# Patient Record
Sex: MALE | Race: WHITE | NOT HISPANIC OR LATINO | Employment: FULL TIME
[De-identification: names, ages, dates, MRNs, and addresses within clinical notes are randomized per-mention and may not be internally consistent; named-entity substitution may affect disease eponyms.]

---

## 2024-01-05 ENCOUNTER — TRANSCRIBE ORDERS (OUTPATIENT)
Dept: OTHER | Age: 44
End: 2024-01-05

## 2024-01-05 DIAGNOSIS — M76.52 PATELLAR TENDINITIS OF LEFT KNEE: Primary | ICD-10-CM

## 2024-01-29 ASSESSMENT — ACTIVITIES OF DAILY LIVING (ADL)
GO DOWN STAIRS: ACTIVITY IS MINIMALLY DIFFICULT
KNEEL ON THE FRONT OF YOUR KNEE: ACTIVITY IS SOMEWHAT DIFFICULT
GIVING WAY, BUCKLING OR SHIFTING OF KNEE: I DO NOT HAVE THE SYMPTOM
SQUAT: ACTIVITY IS MINIMALLY DIFFICULT
HOW_WOULD_YOU_RATE_THE_CURRENT_FUNCTION_OF_YOUR_KNEE_DURING_YOUR_USUAL_DAILY_ACTIVITIES_ON_A_SCALE_FROM_0_TO_100_WITH_100_BEING_YOUR_LEVEL_OF_KNEE_FUNCTION_PRIOR_TO_YOUR_INJURY_AND_0_BEING_THE_INABILITY_TO_PERFORM_ANY_OF_YOUR_USUAL_DAILY_ACTIVITIES?: 80
STIFFNESS: THE SYMPTOM AFFECTS MY ACTIVITY SLIGHTLY
GO UP STAIRS: ACTIVITY IS MINIMALLY DIFFICULT
SIT WITH YOUR KNEE BENT: ACTIVITY IS NOT DIFFICULT
SWELLING: THE SYMPTOM AFFECTS MY ACTIVITY MODERATELY
PAIN: THE SYMPTOM AFFECTS MY ACTIVITY MODERATELY
PLEASE_INDICATE_YOR_PRIMARY_REASON_FOR_REFERRAL_TO_THERAPY:: KNEE
LIMPING: THE SYMPTOM AFFECTS MY ACTIVITY SLIGHTLY
KNEEL ON THE FRONT OF YOUR KNEE: ACTIVITY IS SOMEWHAT DIFFICULT
HOW_WOULD_YOU_RATE_THE_CURRENT_FUNCTION_OF_YOUR_KNEE_DURING_YOUR_USUAL_DAILY_ACTIVITIES_ON_A_SCALE_FROM_0_TO_100_WITH_100_BEING_YOUR_LEVEL_OF_KNEE_FUNCTION_PRIOR_TO_YOUR_INJURY_AND_0_BEING_THE_INABILITY_TO_PERFORM_ANY_OF_YOUR_USUAL_DAILY_ACTIVITIES?: 80
WEAKNESS: THE SYMPTOM AFFECTS MY ACTIVITY SLIGHTLY
AS_A_RESULT_OF_YOUR_KNEE_INJURY,_HOW_WOULD_YOU_RATE_YOUR_CURRENT_LEVEL_OF_DAILY_ACTIVITY?: NEARLY NORMAL
SIT WITH YOUR KNEE BENT: ACTIVITY IS NOT DIFFICULT
STIFFNESS: THE SYMPTOM AFFECTS MY ACTIVITY SLIGHTLY
PAIN: THE SYMPTOM AFFECTS MY ACTIVITY MODERATELY
GO UP STAIRS: ACTIVITY IS MINIMALLY DIFFICULT
STAND: ACTIVITY IS MINIMALLY DIFFICULT
LIMPING: THE SYMPTOM AFFECTS MY ACTIVITY SLIGHTLY
RAW_SCORE: 51
WALK: ACTIVITY IS MINIMALLY DIFFICULT
RISE FROM A CHAIR: ACTIVITY IS NOT DIFFICULT
HOW_WOULD_YOU_RATE_THE_OVERALL_FUNCTION_OF_YOUR_KNEE_DURING_YOUR_USUAL_DAILY_ACTIVITIES?: NEARLY NORMAL
KNEE_ACTIVITY_OF_DAILY_LIVING_SUM: 51
SWELLING: THE SYMPTOM AFFECTS MY ACTIVITY MODERATELY
WEAKNESS: THE SYMPTOM AFFECTS MY ACTIVITY SLIGHTLY
AS_A_RESULT_OF_YOUR_KNEE_INJURY,_HOW_WOULD_YOU_RATE_YOUR_CURRENT_LEVEL_OF_DAILY_ACTIVITY?: NEARLY NORMAL
GO DOWN STAIRS: ACTIVITY IS MINIMALLY DIFFICULT
STAND: ACTIVITY IS MINIMALLY DIFFICULT
WALK: ACTIVITY IS MINIMALLY DIFFICULT
KNEE_ACTIVITY_OF_DAILY_LIVING_SCORE: 72.86
SQUAT: ACTIVITY IS MINIMALLY DIFFICULT
GIVING WAY, BUCKLING OR SHIFTING OF KNEE: I DO NOT HAVE THE SYMPTOM
HOW_WOULD_YOU_RATE_THE_OVERALL_FUNCTION_OF_YOUR_KNEE_DURING_YOUR_USUAL_DAILY_ACTIVITIES?: NEARLY NORMAL
RISE FROM A CHAIR: ACTIVITY IS NOT DIFFICULT

## 2024-01-29 NOTE — PROGRESS NOTES
"PHYSICAL THERAPY EVALUATION  Type of Visit: Evaluation    See electronic medical record for Abuse and Falls Screening details.    Subjective   Has had left knee issues on & off for a while. Current complaint of patellar tendon pain that worsens with jumping.   Does a lot of sports & exercise so is tough on his knee; it is his jumping/takeoff leg. Plays basketball 3-4 days per week, marathon runner, golf & curling. Patella fracture 1.5 years ago landed on kneecap playing basketball - non-op treatment did PT and got \"most of the way back.\"   This past fall training for marathon had increasing foot pain so stopped training after about 18 miles & took some time off.   Instead started strength training more for golf, including jumping, which seemed to flare the knee again. This started in late October. Continued doing jumping program until pain increased to the point it affected his walking. Has not tried jump training since mid November, but has continued with strength training. Pain improved when stopping so much jumping.   Last week tried to do box jumps again (first time in a while) and it was still quite painful. More painful on the take-off than the landing.  Goes to [employer] and works with a ; has a full gym at work. Goes into office & gym 3x/week.   Hx of quad tendon pain with running.   Also reports occasional anterior shin pain (anterior/lateral) - sometimes the next day or evenings after workouts.          Presenting condition or subjective complaint: Left knee pain  Date of onset: 11/30/23    Relevant medical history:     Dates & types of surgery:  None    Prior diagnostic imaging/testing results: MRI; X-ray   1/15/23  1. Partial tearing/tendinopathy of the proximal patellar tendon as described.  2. Full-thickness fissuring of central trochlear cartilage with mild adjacent subchondral marrow edema.  3. Mild edema at the superolateral aspect of Hoffa's fat, can be seen with " impingement. May also in part be reactive given the patellar tendinopathy.  4. No ligamentous or meniscal tear identified.   Prior therapy history for the same diagnosis, illness or injury: Yes PT for fractured knee cap and tendinitis, May 2022    Prior Level of Function  Transfers: Independent  Ambulation: Independent  ADL: Independent  IADL: Childcare, Driving, Finances, Work, Yard work    Living Environment  Social support: With a significant other or spouse & 2 kids age 13 and 9  Type of home: House   Stairs to enter the home: Yes 3 Is there a railing: Yes   Ramp: No   Stairs inside the home: Yes 16 Is there a railing: Yes   Help at home: None  Equipment owned:       Employment: Yes [employer]   Hobbies/Interests: Golf, curling, running, basketball. Golf weekly through the summer.   Workout: 3-days per week full body. Including heel elevated dumbbell squatting (occasional but rare pain); heel elevated step-downs; single RDLs; side lunge with a twist. Doing something different 3 days. Knee has been mostly pain-free with strength training  -also does mobility work almost every day, including ankles, shoulders, & back  -current program does not include leg press machine  -Current program has been going on about 1 month.     Patient goals for therapy: Run and play basketball     Pain assessment: Pain denied  See objective evaluation for additional pain details     Objective   KNEE EVALUATION  PAIN: Pain Level at Rest: 0/10  Pain Level with Use: 4/10  Pain Location: patellar tendon (globally), occasionally radiates med & lat around fat pad/liliya-patellar area   Pain Quality: stiff (morning); achy in general; sharp with jumping  Pain Frequency: intermittent or most days per week but not every day   Pain is Worst: morning or with activity   Pain is Exacerbated By: jumping  Pain is Relieved By: resting/not jumping; icing; ibuprofen. Tried a patellar strap but felt like it was irritating it more.  Pain  "Progression: Improved  INTEGUMENTARY (edema, incisions):  prominent bump at inferior patellar pole      BALANCE/PROPRIOCEPTION: WNL  WEIGHTBEARING ALIGNMENT: WNL    ROM: PROM WNL - no knee pain with overpressure into extension or flexion  Hip: globally decreased into IR, ER, and JENNIFER bilaterally (no pain)    STRENGTH: force measured in pounds at 60 deg knee flexion using Tindeq   Left Right Limb Symmetry Index   Knee Extension 147.1 lbs  -last 2 reps slight twinge 167 lbs 88%     FLEXIBILITY: Decreased piriformis L, Decreased quadriceps L, Decreased hamstrings L, Decreased piriformis R, Decreased quadriceps R, Decreased hamstrings R  Hamstrings: in 90-90 achieves ~35 deg knee flexion. Quads: prone knee bend heel ~6\" from buttock report of mid-thigh stretch     FUNCTIONAL TESTS:  Able to complet double & single leg squat with good form and no pain. Deferred jump testing   PALPATION:  +patellar tendon origin at distal patella pole      Assessment & Plan   CLINICAL IMPRESSIONS  Medical Diagnosis: Patellar tendinitis of left knee    Treatment Diagnosis: Patellar tendinitis of left knee   Impression/Assessment: Patient is a 43 year old male with left knee complaints.  The following significant findings have been identified: Pain, Decreased ROM/flexibility, Decreased strength, Inflammation, Impaired muscle performance, and Decreased activity tolerance. These impairments interfere with their ability to perform recreational activities and community mobility as compared to previous level of function.   KEY PT FINDINGS:  1) 20# difference (88% LSI) in knee extension strength  2) Low irritability - no pain reproduced with knee extension testing or squatting  3) Decreased flexibility of quads, hamstrings, & hip rotation      Clinical Decision Making (Complexity):  Clinical Presentation: Stable/Uncomplicated  Clinical Presentation Rationale: based on medical and personal factors listed in PT evaluation  Clinical Decision Making " (Complexity): Low complexity    PLAN OF CARE  Treatment Interventions:  Interventions: Manual Therapy, Neuromuscular Re-education, Therapeutic Activity, Therapeutic Exercise, Self-Care/Home Management    Long Term Goals     PT Goal 1  Goal Identifier: Quad Strength  Goal Description: Pt will achieve at least 95% limb symmetry index in quadriceps strength  Rationale:  (progress return to sport)  Target Date: 03/26/24  PT Goal 2  Goal Identifier: Return to Run  Goal Description: Pt will be independent with return to run program and tolerate 3 miles not limited by knee pain  Rationale: to maximize safety and independence with self cares (safe return to sport)  Target Date: 04/30/24  PT Goal 3  Goal Identifier: Jumping  Goal Description: Pt will be able to participate in jump training 2x/week not limited by knee pain  Rationale: to maximize safety and independence with self cares (for safe return to sport)  Target Date: 04/30/24      Frequency of Treatment: 1x/week tapering to 2x/month  Duration of Treatment: up to 3 months    Recommended Referrals to Other Professionals: None  Education Assessment:   Learner/Method: Patient    Risks and benefits of evaluation/treatment have been explained.   Patient/Family/caregiver agrees with Plan of Care.     Evaluation Time:     PT Eval, Low Complexity Minutes (82144): 20     Signing Clinician: Shannon Green PT

## 2024-01-30 ENCOUNTER — THERAPY VISIT (OUTPATIENT)
Dept: PHYSICAL THERAPY | Facility: CLINIC | Age: 44
End: 2024-01-30
Attending: EMERGENCY MEDICINE
Payer: COMMERCIAL

## 2024-01-30 DIAGNOSIS — M76.52 PATELLAR TENDINITIS, LEFT KNEE: Primary | ICD-10-CM

## 2024-01-30 DIAGNOSIS — M25.562 LEFT KNEE PAIN: ICD-10-CM

## 2024-01-30 PROCEDURE — 97110 THERAPEUTIC EXERCISES: CPT | Mod: GP

## 2024-01-30 PROCEDURE — 97161 PT EVAL LOW COMPLEX 20 MIN: CPT | Mod: GP

## 2024-02-08 ENCOUNTER — THERAPY VISIT (OUTPATIENT)
Dept: PHYSICAL THERAPY | Facility: CLINIC | Age: 44
End: 2024-02-08
Payer: COMMERCIAL

## 2024-02-08 DIAGNOSIS — M25.562 LEFT KNEE PAIN: Primary | ICD-10-CM

## 2024-02-08 DIAGNOSIS — M76.52 PATELLAR TENDINITIS, LEFT KNEE: ICD-10-CM

## 2024-02-08 PROCEDURE — 97530 THERAPEUTIC ACTIVITIES: CPT | Mod: GP

## 2024-02-08 PROCEDURE — 97110 THERAPEUTIC EXERCISES: CPT | Mod: GP

## 2024-02-22 ENCOUNTER — THERAPY VISIT (OUTPATIENT)
Dept: PHYSICAL THERAPY | Facility: CLINIC | Age: 44
End: 2024-02-22
Payer: COMMERCIAL

## 2024-02-22 DIAGNOSIS — M76.52 PATELLAR TENDINITIS, LEFT KNEE: Primary | ICD-10-CM

## 2024-02-22 DIAGNOSIS — M25.562 LEFT KNEE PAIN: ICD-10-CM

## 2024-02-22 PROCEDURE — 97530 THERAPEUTIC ACTIVITIES: CPT | Mod: GP

## 2024-02-22 PROCEDURE — 97110 THERAPEUTIC EXERCISES: CPT | Mod: GP

## 2024-03-07 ENCOUNTER — THERAPY VISIT (OUTPATIENT)
Dept: PHYSICAL THERAPY | Facility: CLINIC | Age: 44
End: 2024-03-07
Payer: COMMERCIAL

## 2024-03-07 DIAGNOSIS — M25.562 LEFT KNEE PAIN: Primary | ICD-10-CM

## 2024-03-07 DIAGNOSIS — M76.52 PATELLAR TENDINITIS, LEFT KNEE: ICD-10-CM

## 2024-03-07 PROCEDURE — 97110 THERAPEUTIC EXERCISES: CPT | Mod: GP

## 2024-03-07 PROCEDURE — 97530 THERAPEUTIC ACTIVITIES: CPT | Mod: GP

## 2024-03-10 ENCOUNTER — HEALTH MAINTENANCE LETTER (OUTPATIENT)
Age: 44
End: 2024-03-10

## 2024-03-21 ENCOUNTER — THERAPY VISIT (OUTPATIENT)
Dept: PHYSICAL THERAPY | Facility: CLINIC | Age: 44
End: 2024-03-21
Payer: COMMERCIAL

## 2024-03-21 DIAGNOSIS — M25.562 LEFT KNEE PAIN: Primary | ICD-10-CM

## 2024-03-21 DIAGNOSIS — M76.52 PATELLAR TENDINITIS, LEFT KNEE: ICD-10-CM

## 2024-03-21 PROCEDURE — 97530 THERAPEUTIC ACTIVITIES: CPT | Mod: GP

## 2024-03-21 PROCEDURE — 97110 THERAPEUTIC EXERCISES: CPT | Mod: GP

## 2024-03-21 NOTE — PROGRESS NOTES
ASSESSMENT   Good improvement with full participation in strength training and modified plyometrics. Initiated return to run today with goal to increase mileage by this summer and return to basketball. No more than 4/10 pain and it does not linger after plyometrics 3x/week.     PLAN  Continue therapy per current plan of care.  2x/month for 2 months.     Beginning/End Dates of Progress Note Reporting Period:  01/30/24 to 03/21/2024    Referring Provider:  Courtney Lew     03/21/24 0500   Appointment Info   Signing clinician's name / credentials Shannon Green PT DPT [facility]   Total/Authorized Visits E&T (8 plan)   Visits Used 5   Medical Diagnosis Patellar tendinitis of left knee   PT Tx Diagnosis Patellar tendinitis of left knee   Other pertinent information high level - basketball, marathon, golf, curling. hx of patella fracture   Progress Note/Certification   Onset of illness/injury or Date of Surgery 11/30/23   Therapy Frequency 1x/week tapering to 2x/month   Predicted Duration up to 3 months   Progress Note Completed Date 01/30/24   GOALS   PT Goals 2;3   PT Goal 1   Goal Identifier Quad Strength   Goal Description Pt will achieve at least 95% limb symmetry index in quadriceps strength   Rationale   (progress return to sport)   Goal Progress goal met - 103% today   Target Date 03/26/24   Date Met 03/21/24   PT Goal 2   Goal Identifier Return to Run   Goal Description Pt will be independent with return to run program and tolerate 3 miles not limited by knee pain   Rationale to maximize safety and independence with self cares  (safe return to sport)   Goal Progress initiated return to run today   Target Date 04/30/24   PT Goal 3   Goal Identifier Jumping   Goal Description Pt will be able to participate in jump training 2x/week not limited by knee pain   Rationale to maximize safety and independence with self cares  (for safe return to sport)   Goal Progress gradually re-introducing plyos with good tolerance   Target  "Date 04/30/24   Subjective Report   Subjective Report Doing well. Saw sports med doc and plans to continue with PT vs. PRP or surgery for now. Started jumping a little more and it has felt okay - occasionally 3-4 soreness afterwards that does not linger. Thinks it feels better on a hard surface. Was able to re-intro skater jumps to workouts without issue.   Objective Measures   Objective Measures Objective Measure 1;Objective Measure 2   Objective Measure 1   Objective Measure jump squat   Details slightly knee dominant landing; no pain   Objective Measure 2   Objective Measure Quad Strength - Tindeq   Details Right: 172.0 lbs; Left: 177.3 lbs.  103% On Left slight discomfort initially but dissipates. \"awareness\" in quad tendon.   Treatment Interventions (PT)   Interventions Therapeutic Procedure/Exercise;Therapeutic Activity   Therapeutic Procedure/Exercise   Therapeutic Procedures: strength, endurance, ROM, flexibility minutes (97351) 12   Therapeutic Procedures Ther Proc 2   Ther Proc 2 bike   Ther Proc 2 - Details level 5; 5 minute warm up.   PTRx Ther Proc 1 Lunges   PTRx Ther Proc 1 - Details 4x 20'   PTRx Ther Proc 2 Knee Extension   PTRx Ther Proc 2 - Details x15 B black TB   PTRx Ther Proc 3 TE   PTRx Ther Proc 3 - Details reviewed strength routine with videos, at 12 reps start increasing weight & decreasing reps   Skilled Intervention HEP instruction with dosage, rationale   Patient Response/Progress no pain, receptive   Therapeutic Activity   Therapeutic Activities: dynamic activities to improve functional performance minutes (15844) 25   Therapeutic Activities Ther Act 2   Ther Act 1 Testing   Ther Act 1 - Details quad strength dynamometry; time to interpret results to inform care planning   Ther Act 2 Return to run   Ther Act 2 - Details discussed walk/jog progressions and how to self-monitor & progress; initiate this week 2-3x/week   PTRx Ther Act 1 Double Leg Jumps   PTRx Ther Act 1 - Details squat " jump 2x 5   PTRx Ther Act 2 Double Leg Jumps   PTRx Ther Act 2 - Details 6x 2 over hurdles, working on soft landing & hip hinge   PTRx Ther Act 3 pt ed   PTRx Ther Act 3 - Details reviewed plyo/agility routine - gradually increase challenge as long as tolerable/pain managed   Skilled Intervention progressed plyos; activity modification & self management techniques   Patient Response/Progress no pain; receptive   Education   Learner/Method Patient   Plan   Home program videos   Updates to plan of care progressed plyos - double leg jumps over hurdles. OK return to run today   Plan for next session review strength routine; continue power development; check on return to run   Total Session Time   Timed Code Treatment Minutes 37   Total Treatment Time (sum of timed and untimed services) 37

## 2024-04-11 ENCOUNTER — THERAPY VISIT (OUTPATIENT)
Dept: PHYSICAL THERAPY | Facility: CLINIC | Age: 44
End: 2024-04-11
Payer: COMMERCIAL

## 2024-04-11 DIAGNOSIS — M25.562 LEFT KNEE PAIN: ICD-10-CM

## 2024-04-11 DIAGNOSIS — M76.52 PATELLAR TENDINITIS, LEFT KNEE: Primary | ICD-10-CM

## 2024-04-11 PROCEDURE — 97110 THERAPEUTIC EXERCISES: CPT | Mod: GP

## 2024-04-11 PROCEDURE — 97140 MANUAL THERAPY 1/> REGIONS: CPT | Mod: GP

## 2024-04-25 ENCOUNTER — THERAPY VISIT (OUTPATIENT)
Dept: PHYSICAL THERAPY | Facility: CLINIC | Age: 44
End: 2024-04-25
Payer: COMMERCIAL

## 2024-04-25 DIAGNOSIS — M25.562 LEFT KNEE PAIN: Primary | ICD-10-CM

## 2024-04-25 DIAGNOSIS — M76.52 PATELLAR TENDINITIS, LEFT KNEE: ICD-10-CM

## 2024-04-25 PROCEDURE — 97140 MANUAL THERAPY 1/> REGIONS: CPT | Mod: GP

## 2024-04-25 PROCEDURE — 97110 THERAPEUTIC EXERCISES: CPT | Mod: GP

## 2024-04-25 PROCEDURE — 97530 THERAPEUTIC ACTIVITIES: CPT | Mod: GP

## 2024-05-09 ENCOUNTER — THERAPY VISIT (OUTPATIENT)
Dept: PHYSICAL THERAPY | Facility: CLINIC | Age: 44
End: 2024-05-09
Payer: COMMERCIAL

## 2024-05-09 DIAGNOSIS — M76.52 PATELLAR TENDINITIS, LEFT KNEE: ICD-10-CM

## 2024-05-09 DIAGNOSIS — M25.562 LEFT KNEE PAIN: Primary | ICD-10-CM

## 2024-05-09 PROCEDURE — 97530 THERAPEUTIC ACTIVITIES: CPT | Mod: GP

## 2024-05-09 PROCEDURE — 97140 MANUAL THERAPY 1/> REGIONS: CPT | Mod: GP

## 2024-05-09 PROCEDURE — 97110 THERAPEUTIC EXERCISES: CPT | Mod: GP

## 2024-05-23 ENCOUNTER — THERAPY VISIT (OUTPATIENT)
Dept: PHYSICAL THERAPY | Facility: CLINIC | Age: 44
End: 2024-05-23
Payer: COMMERCIAL

## 2024-05-23 DIAGNOSIS — M25.562 LEFT KNEE PAIN: Primary | ICD-10-CM

## 2024-05-23 DIAGNOSIS — M76.52 PATELLAR TENDINITIS, LEFT KNEE: ICD-10-CM

## 2024-05-23 PROCEDURE — 97530 THERAPEUTIC ACTIVITIES: CPT | Mod: GP

## 2024-05-23 PROCEDURE — 97110 THERAPEUTIC EXERCISES: CPT | Mod: GP

## 2024-06-20 ENCOUNTER — THERAPY VISIT (OUTPATIENT)
Dept: PHYSICAL THERAPY | Facility: CLINIC | Age: 44
End: 2024-06-20
Payer: COMMERCIAL

## 2024-06-20 DIAGNOSIS — M25.562 LEFT KNEE PAIN: Primary | ICD-10-CM

## 2024-06-20 DIAGNOSIS — M76.52 PATELLAR TENDINITIS, LEFT KNEE: ICD-10-CM

## 2024-06-20 PROCEDURE — 97530 THERAPEUTIC ACTIVITIES: CPT | Mod: GP

## 2024-06-20 PROCEDURE — 97110 THERAPEUTIC EXERCISES: CPT | Mod: GP

## 2024-06-20 ASSESSMENT — ACTIVITIES OF DAILY LIVING (ADL): KNEE_ACTIVITY_OF_DAILY_LIVING_SCORE: INCOMPLETE

## 2024-06-20 NOTE — PROGRESS NOTES
DISCHARGE  Reason for Discharge: Patient has met all goals.  Patient is independent with HEP    Equipment Issued: theraband & PTRx videos    Discharge Plan: Patient to continue home program.  Return to PT PRN.    Referring Provider:  Courtney Lew     06/20/24 0500   Appointment Info   Signing clinician's name / credentials Shannon Green PT DPT [facility]   Total/Authorized Visits E&T (8 plan)   Visits Used 10   Medical Diagnosis Patellar tendinitis of left knee   PT Tx Diagnosis Patellar tendinitis of left knee   Other pertinent information high level - basketball, marathon, golf, curling. hx of patella fracture   Progress Note/Certification   Onset of illness/injury or Date of Surgery 11/30/23   Therapy Frequency 2x/month   Predicted Duration 2 months   Progress Note Completed Date 03/21/24   GOALS   PT Goals 2;3   PT Goal 1   Goal Identifier Quad Strength   Goal Description Pt will achieve at least 95% limb symmetry index in quadriceps strength   Rationale   (progress return to sport)   Goal Progress goal met - 103% today   Target Date 03/26/24   Date Met 03/21/24   PT Goal 2   Goal Identifier Return to Run   Goal Description Pt will be independent with return to run program and tolerate 3 miles not limited by knee pain   Rationale to maximize safety and independence with self cares  (safe return to sport)   Goal Progress has been able to run without pain   Target Date 06/06/24   Date Met 06/20/24   PT Goal 3   Goal Identifier Jumping   Goal Description Pt will be able to participate in jump training 2x/week not limited by knee pain   Rationale to maximize safety and independence with self cares  (for safe return to sport)   Goal Progress participating in plyos roughly 2x/week without pain; basketball 2x/week without pain   Target Date 06/06/24   Date Met 06/20/24   Subjective Report   Subjective Report It's been pretty awesome. I haven't had really any pain at all and I've been pushing it. Working out most every day  "(6/7 days including basketball 2x/week and running). I didn't even notice it playing basketball. Left ankle is more irritated but he is working on managing this.   Objective Measures   Objective Measures Objective Measure 1;Objective Measure 2;Objective Measure 3   Objective Measure 1   Objective Measure Single Leg Squat   Details fair-good mechanics, no knee pain, ongoing frontal plane collapse (femoral add/IR and pelvic drop) - trying to self correct without mirror actually increases collapse   Objective Measure 2   Objective Measure Quad Strength - Tindeq   Details Right: 172.0 lbs; Left: 177.3 lbs.  103% On Left slight discomfort initially but dissipates. \"awareness\" in quad tendon.   Objective Measure 3   Details landing mechanics hip & lumbar dominant vs. knee flexion with lateral movements   Treatment Interventions (PT)   Interventions Therapeutic Procedure/Exercise;Therapeutic Activity;Manual Therapy   Therapeutic Procedure/Exercise   Therapeutic Procedures: strength, endurance, ROM, flexibility minutes (92592) 20   Therapeutic Procedures Ther Proc 2   Ther Proc 1 TE   Ther Proc 1 - Details pt ed in independence with HEP including self-progression, self-management; discussed indications for return to PT vs. MD   Ther Proc 2 bike   Ther Proc 2 - Details level 5; 5 minute warm up.   PTRx Ther Proc 1 TE   PTRx Ther Proc 1 - Details discussed weightlifting routine. back to pre-injury weight - doing 8-12 reps, 4 sets of squatting, lunges, etc. reviewed progressing to 4x 8-6RM as able with quad strength.   PTRx Ther Proc 2 TE   PTRx Ther Proc 2 - Details with videos, reviewed SL decline squat, Sudanese squat isometric, leg press for continued quad loading   PTRx Ther Proc 3 Quad Stretching   PTRx Ther Proc 3 - Details reviewed importance of ongoing quad & PT flexibility & mobility   PTRx Ther Proc 4 Single Leg Squat with Ball   PTRx Ther Proc 4 - Details for glute med activation and pelvic frontal plane re-traning. " 2x 10 L, second set with hip hinge to bias more towards glutes.   Skilled Intervention instruction/cueing for exercise form, dosage & rationale; to promote independence with home exercises   Patient Response/Progress no pain, receptive   Therapeutic Activity   Therapeutic Activities: dynamic activities to improve functional performance minutes (56546) 10   Therapeutic Activities Ther Act 4;Ther Act 3   Ther Act 1 Self STM   Ther Act 1 - Details reviwed options for self STM beyond foam rolling. theragun, try pin & stretch on foam roller, massge from spouse  (reviewed)   Ther Act 2 DL CMJ Squat   Ther Act 2 - Details 2 x5 working on explosion, soft landing, tri-joint landing mechanics (slightly bothersome at ankle)   Ther Act 3 Box Jumps   Ther Act 3 - Details discussed ok to try weaning back at the gym   Ther Act 4 Agility/footwork   Ther Act 4 - Details use box or lines on floor for quick direction changes. discussed plyometric/agility routine at the gym and gradually progressing  (reviewed - he reports this is feeling easier & more coordinated)   PTRx Ther Act 1 TA   PTRx Ther Act 1 - Details goals discussion, PT POC update   Skilled Intervention pt-specific education & activity   Patient Response/Progress receptive; no pain   Education   Learner/Method Patient   Plan   Home program videos   Updates to plan of care d/c with HEP   Plan for next session d/c with HEP   Total Session Time   Timed Code Treatment Minutes 30   Total Treatment Time (sum of timed and untimed services) 30

## 2025-03-16 ENCOUNTER — HEALTH MAINTENANCE LETTER (OUTPATIENT)
Age: 45
End: 2025-03-16